# Patient Record
Sex: FEMALE | Race: WHITE
[De-identification: names, ages, dates, MRNs, and addresses within clinical notes are randomized per-mention and may not be internally consistent; named-entity substitution may affect disease eponyms.]

---

## 2019-03-08 ENCOUNTER — HOSPITAL ENCOUNTER (EMERGENCY)
Dept: HOSPITAL 92 - ERS | Age: 34
Discharge: HOME | End: 2019-03-08
Payer: SELF-PAY

## 2019-03-08 DIAGNOSIS — F31.9: ICD-10-CM

## 2019-03-08 DIAGNOSIS — K04.7: ICD-10-CM

## 2019-03-08 DIAGNOSIS — R07.89: Primary | ICD-10-CM

## 2019-03-08 DIAGNOSIS — E03.9: ICD-10-CM

## 2019-03-08 DIAGNOSIS — Z79.899: ICD-10-CM

## 2019-03-08 DIAGNOSIS — G43.909: ICD-10-CM

## 2019-03-08 DIAGNOSIS — F17.210: ICD-10-CM

## 2019-03-08 DIAGNOSIS — K02.9: ICD-10-CM

## 2019-03-08 PROCEDURE — 71045 X-RAY EXAM CHEST 1 VIEW: CPT

## 2019-03-08 PROCEDURE — 93005 ELECTROCARDIOGRAM TRACING: CPT

## 2019-03-08 NOTE — RAD
AP CHEST:

 

History: Chest pain. 

 

Date: 3-8-19 

 

FINDINGS: 

The lungs are well aerated. No evidence of active intrathoracic disease seen. No evidence of effusion
s, pneumonia, or pneumothorax seen. 

 

IMPRESSION: 

Unremarkable AP view chest. 

 

POS: [facility]

## 2019-04-19 ENCOUNTER — HOSPITAL ENCOUNTER (EMERGENCY)
Dept: HOSPITAL 92 - ERS | Age: 34
Discharge: LEFT BEFORE DISCHARGE | End: 2019-04-19
Payer: SELF-PAY

## 2019-04-19 DIAGNOSIS — Z53.21: Primary | ICD-10-CM

## 2020-11-18 ENCOUNTER — HOSPITAL ENCOUNTER (EMERGENCY)
Dept: HOSPITAL 92 - ERS | Age: 35
Discharge: HOME | End: 2020-11-18
Payer: SELF-PAY

## 2020-11-18 DIAGNOSIS — D25.9: Primary | ICD-10-CM

## 2020-11-18 DIAGNOSIS — F31.9: ICD-10-CM

## 2020-11-18 DIAGNOSIS — J45.909: ICD-10-CM

## 2020-11-18 LAB
PREGU CONTROL BACKGROUND?: (no result)
PREGU CONTROL BAR APPEAR?: YES
PROT UR STRIP.AUTO-MCNC: 20 MG/DL
SP GR UR STRIP: 1.03 (ref 1–1.04)

## 2020-11-18 PROCEDURE — 81025 URINE PREGNANCY TEST: CPT

## 2020-11-18 PROCEDURE — 81003 URINALYSIS AUTO W/O SCOPE: CPT

## 2020-11-18 PROCEDURE — 74176 CT ABD & PELVIS W/O CONTRAST: CPT

## 2020-11-18 PROCEDURE — 96372 THER/PROPH/DIAG INJ SC/IM: CPT

## 2020-11-18 NOTE — CT
PRELIMINARY REPORT/[facility]/EMERGENCY AFTER HOURS PROCEDURE:

 

EXAM: 

CT Abdomen and Pelvis Without Intravenous Contrast 

 

CLINICAL HISTORY: 

Patient here for evaluation of right flank pain. Patient states that the pain started on Friday or Sa
turday. Thought that it was just musculoskeletal pain but then got concerned that might be something 
more significant when over-the-counter pain medication was not helping it. States that it was radiati
ng down to her hip but the hip pain is gone. 

 

TECHNIQUE: 

Axial computed tomography images of the abdomen and pelvis without intravenous contrast. 

 

CONTRAST: 

None. 

 

COMPARISON: 

None provided. 

 

FINDINGS: 

 

LUNG BASES: 

Subsegmental atelectasis at the lung bases. 

 

LIVER: 

The liver is mildly enlarged measuring up to 17.4 m in craniocaudal dimension. 

 

GALLBLADDER AND BILE DUCTS: 

The gallbladder is contracted. 

 

PANCREAS: 

Unremarkable. 

 

SPLEEN: 

Unremarkable. 

 

ADRENAL GLANDS: 

Unremarkable. 

 

KIDNEYS, URETERS, AND BLADDER: 

There is free fluid and fat stranding surrounding the inferior aspect of the right kidney and proxima
l right ureter with minimal fullness of the right ureter to the level of the mid abdomen. No definite
 ureteral stone visualized.  Calcifications in the pelvis likely represent phleboliths.  The ureter m
ay be compressed secondary to the enlarged uterus, however a superimposed ascending urinary tract inf
ection cannot be excluded. 

 

STOMACH AND BOWEL: 

No obstruction. No wall thickening. No CT evidence of colitis or acute diverticulitis. 

 

APPENDIX: 

Normal appendix. 

 

PERITONEUM: 

No free fluid. No free air. 

 

LYMPH NODES: 

No lymphadenopathy. 

 

REPRODUCTIVE: 

Enlarged uterus likely secondary to underlying fibroids. 

 

VASCULATURE: 

No aortic aneurysm. 

 

ABDOMINAL WALL AND SOFT TISSUES: 

Unremarkable. 

 

BONES: 

No fracture or suspicious osseous abnormality. 

 

IMPRESSION: 

 

Enlarged uterus likely secondary to underlying fibroids. 

There is free fluid and fat stranding surrounding the inferior aspect of the right kidney and proxima
l right ureter with minimal fullness of the right ureter to the level of the mid abdomen. No definite
 ureteral stone visualized. The ureter may be compressed secondary to the enlarged uterus, however a 
superimposed ascending urinary tract infection cannot be excluded.

 

 

 

 

ELECTRONICALLY SIGNED BY:

 

Keturah Ogden MD

 

 

 

Nov 18, 2020 4:10:53 AM CST

 

 

 

This report is intended for review by the ordering physician only, in accordance of law. If you recei
ve this report in error, please call [facility] at [phone].

 

 

 

 

 

 

 

 

FINAL REPORT 

 

EMERGENT AFTER HOURS CT OF THE ABDOMEN AND PELVIS WITHOUT CONTRAST:

 

FINDINGS/IMPRESSION: 

I agree with the findings and impression given in the preliminary report per [facility] physici
an.  

 

1.  There is enlargement of the right ureter and stranding changes surrounding the ureter concerning 
for pyelonephritis and ascending urinary tract infection.  

 

2.  The uterus is enlarged likely secondary to uterine fibroids.

 

POS: OFF